# Patient Record
(demographics unavailable — no encounter records)

---

## 2024-12-04 NOTE — PHYSICAL EXAM
[Respiratory Effort] : normal respiratory effort [Calm] : calm [de-identified] : Soft, nontender, nondistended.  No mass or hernia appreciated [de-identified] : Well-appearing, in no distress [de-identified] : Normocephalic, atraumatic [de-identified] : Moves extremities without difficulty [de-identified] : Warm and dry [de-identified] : Alert and oriented x3

## 2024-12-04 NOTE — HISTORY OF PRESENT ILLNESS
[FreeTextEntry1] : 65-year-old male who presents for a colonoscopy.  He has prolapsing internal hemorrhoids for which surgery was recommended.  He did not pursue surgery or colonoscopy due to insurance reasons and is now here for with the same problem.  Bleeding occurs mostly with bowel movements but occasionally happens even without bowel movements.  He has also noticed some soiling in his underwear.  He is using over-the-counter bowel regimen to help with constipation.  Denies abdominal pain, nausea or vomiting.